# Patient Record
Sex: FEMALE | Race: WHITE | URBAN - METROPOLITAN AREA
[De-identification: names, ages, dates, MRNs, and addresses within clinical notes are randomized per-mention and may not be internally consistent; named-entity substitution may affect disease eponyms.]

---

## 2023-07-24 ENCOUNTER — OFFICE VISIT (OUTPATIENT)
Dept: AUDIOLOGY | Facility: CLINIC | Age: 63
End: 2023-07-24

## 2023-07-24 ENCOUNTER — OFFICE VISIT (OUTPATIENT)
Dept: OTOLARYNGOLOGY | Facility: CLINIC | Age: 63
End: 2023-07-24

## 2023-07-24 VITALS — SYSTOLIC BLOOD PRESSURE: 116 MMHG | HEART RATE: 86 BPM | WEIGHT: 154 LBS | DIASTOLIC BLOOD PRESSURE: 82 MMHG

## 2023-07-24 DIAGNOSIS — R42 VERTIGO: Primary | ICD-10-CM

## 2023-07-24 DIAGNOSIS — H90.3 ASYMMETRIC SNHL (SENSORINEURAL HEARING LOSS): ICD-10-CM

## 2023-07-24 DIAGNOSIS — H90.41 SENSORINEURAL HEARING LOSS (SNHL) OF RIGHT EAR WITH UNRESTRICTED HEARING OF LEFT EAR: Primary | ICD-10-CM

## 2023-07-24 DIAGNOSIS — H90.41 SENSORINEURAL HEARING LOSS (SNHL) OF RIGHT EAR WITH UNRESTRICTED HEARING OF LEFT EAR: ICD-10-CM

## 2023-07-24 PROCEDURE — 99204 OFFICE O/P NEW MOD 45 MIN: CPT | Performed by: OTOLARYNGOLOGY

## 2023-07-24 PROCEDURE — 3074F SYST BP LT 130 MM HG: CPT | Performed by: OTOLARYNGOLOGY

## 2023-07-24 PROCEDURE — 3079F DIAST BP 80-89 MM HG: CPT | Performed by: OTOLARYNGOLOGY

## 2023-07-24 PROCEDURE — 92557 COMPREHENSIVE HEARING TEST: CPT | Performed by: AUDIOLOGIST

## 2023-07-24 PROCEDURE — 92567 TYMPANOMETRY: CPT | Performed by: AUDIOLOGIST

## 2023-07-24 RX ORDER — OMEGA-3S/DHA/EPA/FISH OIL 1000-1400
CAPSULE,DELAYED RELEASE (ENTERIC COATED) ORAL
COMMUNITY

## 2023-07-24 RX ORDER — MECLIZINE HYDROCHLORIDE 25 MG/1
25 TABLET ORAL
COMMUNITY
Start: 2023-02-27

## 2023-07-24 RX ORDER — MULTIVIT-MINERALS/FOLIC ACID 200 MCG
TABLET,CHEWABLE ORAL
COMMUNITY

## 2023-07-24 RX ORDER — GAUZE BANDAGE 2" X 2"
BANDAGE TOPICAL
COMMUNITY

## 2023-07-24 RX ORDER — DESVENLAFAXINE SUCCINATE 50 MG/1
50 TABLET, EXTENDED RELEASE ORAL DAILY
COMMUNITY
Start: 2021-05-10

## 2023-07-24 RX ORDER — GLUCOSAMINE/CHONDR SU A SOD 750-600 MG
TABLET ORAL
COMMUNITY

## 2023-07-24 RX ORDER — DIPHENHYDRAMINE HCL/ZINC ACET 2 %-0.1 %
AEROSOL, SPRAY (GRAM) TOPICAL
COMMUNITY

## 2023-07-24 RX ORDER — VALACYCLOVIR HYDROCHLORIDE 500 MG/1
500 TABLET, FILM COATED ORAL DAILY
COMMUNITY
Start: 2022-05-26

## 2023-07-24 RX ORDER — ASCORBIC ACID 1000 MG
TABLET ORAL
COMMUNITY

## 2023-07-26 ENCOUNTER — TELEPHONE (OUTPATIENT)
Dept: OTOLARYNGOLOGY | Facility: CLINIC | Age: 63
End: 2023-07-26

## 2023-07-27 NOTE — TELEPHONE ENCOUNTER
Per pt asking for mri order to be sent to [facility] in [facility]    fax# [phone]

## 2023-07-28 NOTE — TELEPHONE ENCOUNTER
Per pt asking for a call when the mri order has been faxed to [facility].     Fax: [phone]

## 2023-08-01 NOTE — TELEPHONE ENCOUNTER
Per pt PCP ordered MRI , prior auth obtained by them. Pt does not need order faxed. Pt scheduled follow up and will bring report along with imaging .

## 2023-08-01 NOTE — PROGRESS NOTES
NEW PATIENT PROGRESS NOTE  OTOLOGY/OTOLARYNGOLOGY    REF MD:  [address]     PCP: Hero Lockette:  Patient presents with:  Dizziness: Patient here for vertigo symptoms x February     HISTORY OF PRESENT ILLNESS: Ed Hwang is a 63year old female who presents for evaluation of vertigo. The problem started in February. Thought it was from falling asleep with her neck extended. Woke up with vertigo that lasted 2 days so went to IC. Was sent to ED as had an abnormal EKG but ended up with no other findings. Was discharged on meclizine. Vertigo lasted about a week. Triggered by laying down, bending forward. Would last about a week intermittently lasting less than a minute each time. No hearing complaints. No associated ear symptoms. No tinnitus. Would go to chiropractor for neck adjustments which seemed helpful. Also went to accupuncture. Started vestibular PT and has had about 4 sessions. Denies hearing loss, hearing fluctuation, otorrhea, otalgia or previous otologic procedure. PAST MEDICAL HISTORY:  History reviewed. No pertinent past medical history. PAST SURGICAL HISTORY:  History reviewed. No pertinent surgical history. Calcium Carbonate-Vit D-Min (CALCIUM 600+D3 PLUS MINERALS) 600-800 MG-UNIT Oral Chew Tab, , Disp: , Rfl:   B Complex-C (SUPER B COMPLEX/VITAMIN C) Oral Tab, , Disp: , Rfl:   desvenlafaxine ER 50 MG Oral Tablet 24 Hr, Take 1 tablet (50 mg total) by mouth daily. , Disp: , Rfl:   Fiber Adult Gummies 2 g Oral Chew Tab, , Disp: , Rfl:   Ginkgo Biloba 40 MG Oral Tab, , Disp: , Rfl:   Lutein-Zeaxanthin 25-5 MG Oral Cap, , Disp: , Rfl:   meclizine 25 MG Oral Tab, Take 1 tablet (25 mg total) by mouth., Disp: , Rfl:   Multiple Vitamin (ONE-A-DAY ADULT VITACRAVES+DHA) Oral Chew Tab, , Disp: , Rfl:   Omega-3 Fatty Acids (CVS OMEGA-3 GUMMY FISH/DHA) 113.5 MG Oral Chew Tab, , Disp: , Rfl:   valACYclovir 500 MG Oral Tab, Take 1 tablet (500 mg total) by mouth daily. , Disp: , Rfl:     No current facility-administered medications on file prior to visit. Allergies:   Sulfa Antibiotics       HIVES, OTHER (SEE COMMENTS)    Comment:Swelling, rash, uticartia    SOCIAL HISTORY:  Social History    Tobacco Use      Smoking status: Not on file      Smokeless tobacco: Not on file    Alcohol use: Not on file      FAMILY HISTORY: Denies known family history of hearing loss, tinnitus, vertigo, or migraine. Denies known family history of head and neck cancer, thyroid cancer, bleeding disorders. REVIEW OF SYSTEMS:   Positives are in bold  Neuro: Headache, facial weakness, facial numbness, neck pain, vertigo  ENT: Hearing change, tinnitus, otorrhea, otalgia, aural fullness, ear pressure, vertigo, imbalance  Sinus pressure, rhinorrhea, congestion, facial pain, jaw pain, dysphagia, odynophagia, sore throat, voice changes, shortness of breath    EXAMINATION:  I washed my hands with an alcohol-based hand gel prior to examination  Constitutional:   --Vitals: Blood pressure 116/82, pulse 86, weight 154 lb (69.9 kg). --General: no apparent distress, well-developed, conversant  Psych: affect pleasant and appropriate for age, alert and oriented  Neuro: Facial movement normal bilateral  Eyes: Pupils equal, symmetric and reactive to light. Extra-ocular muscles intact  Respiratory: No stridor, stertor or increased work of breathing  ENT:  --Ear: The bilateral ears were examined under binocular microscopy  Right ear microscopic exam:  Pinna: Normal, no lesions or masses. Mastoid: Nontender on palpation. External auditory canal: Clear, no masses or lesions. Tympanic membrane: Intact, no lesions, normal landmarks. Middle ear: Aerated. Left ear microscopic exam:  Pinna: Normal, no lesions or masses. Mastoid: Nontender on palpation. External auditory canal: Clear, no masses or lesions. Tympanic membrane: Intact, no lesions, normal landmarks.   Middle ear: Aerated. Audiogram (date 7/27/23) interpreted and reviewed with the patient today showing:  Puretones: Right: normal to mild SNHL to normal Left: normal  Tymps: bilateral type A  SRT: Right: 10 Left: 10  SD: Right: 100 Left: 100    Latest Audiogram Result (Hz) Exam performed: 7/24/2023 4:28 PM Last edited by ELIZABETH Richter on 7/24/2023 4:37 PM        125 250  1500 2000 3000 4000 6000 8000    Right air:  5 10  10  20 20 25 20 5    Left air:  5 10  5  5 10 10 10 5    Right mastoid bone (masked):       20  25      Masking left (mastoid bone):       40  40         Reliability:  Good    Transducer: Inserts    Technique:  Conventional Audiometry    Comments:            Latest Speech Audiometry  Last edited by ELIZABETH Richter on 7/24/2023 4:37 PM       Ear Method SAT SRT MCL UCL Notes    right live voice  10       left live voice  10        Ear Method Test/List Score (%) Intensity Mask/Noise   right live voice 10 By Difficulty 100 55    left live voice 10 By Difficulty 100 55                      Latest Tympanogram Result       Probe Tone (Hz): 226 Exam performed: 7/24/2023 4:29 PM Last edited by ELIZABETH Richter on 7/24/2023 4:37 PM      Tympanograms  These were drawn by a user, not generated from device data      Right Ear Left Ear                     Right Ear Left Ear    Tympanogram type: Type A Type A    Canal volume (mL): 1 0.9    Peak pressure (daPa): 37 -17    Peak amplitude (mL): 0.4 0.3    Tympanogram width (daPa): Comments:                    Latest Audiogram and Tympanogram Result Text  Last edited by ELIZABETH Richter on 7/27/2023 12:14 PM      Addendum      Pt referred by Dr. Jed Braun for hearing testing due to dizziness and vertigo. Right ear: Within normal limits 250-1000 Hz. Slight to mild sensorineural hearing loss 1856-2998 Hz, rising to within normal limits at 8000 Hz. Left ear:  within normal limits 250-8000 Hz.     Normal ear canal volume with normal middle ear pressure and compliance bilaterally. Follow up with Dr. Lauralyn Mohs today. Addended by ELIZABETH Cramer on 7/27/2023 12:14 PM               ASSESSMENT/PLAN:  Nikki Abarca is a 63year old female with   (R42) Vertigo  (primary encounter diagnosis)  (H90.41) Sensorineural hearing loss (SNHL) of right ear with unrestricted hearing of left ear  (H90.3) Asymmetric SNHL (sensorineural hearing loss)     IMPRESSION:  Vertigo, consistent with BPPV, no present today  Asymmetric SNHL - right worse than left  Right SNHL    PLAN:  -I recommend that the asymmetric sensorineural hearing loss and/or tinnitus be evaluated with an MRI of the brain and IAC's with gadolinium. This test is medically necessary to assess for retrocochlear pathology such as a vestibular schwannoma.  -Will retest for BPPV at next visit   -Follow-up after imaging     Situation reviewed with the patient in detail. Aldo Strong MD  Otology/Otolaryngology  [facility]   [address] [facility]  Phone [phone]  Fax [phone]

## 2023-08-13 ENCOUNTER — PATIENT MESSAGE (OUTPATIENT)
Dept: OTOLARYNGOLOGY | Facility: CLINIC | Age: 63
End: 2023-08-13

## 2023-08-15 NOTE — TELEPHONE ENCOUNTER
From: Miya Sevilla  To: Grey Lr MD  Sent: 8/13/2023 10:03 AM CDT  Subject: MRI report attached    Hi. I will bring you the CD at my appointment.     Thank you  Debi Carlos

## 2023-08-15 NOTE — TELEPHONE ENCOUNTER
Future Appointments   Date Time Provider Telma Alex   8/21/2023 10:30 AM Howie Cabrera MD [address] [facility]

## 2023-08-21 ENCOUNTER — OFFICE VISIT (OUTPATIENT)
Dept: OTOLARYNGOLOGY | Facility: CLINIC | Age: 63
End: 2023-08-21

## 2023-08-21 VITALS — TEMPERATURE: 98 F | WEIGHT: 154 LBS

## 2023-08-21 DIAGNOSIS — H81.13 BPPV (BENIGN PAROXYSMAL POSITIONAL VERTIGO), BILATERAL: ICD-10-CM

## 2023-08-21 DIAGNOSIS — H90.3 ASYMMETRIC SNHL (SENSORINEURAL HEARING LOSS): Primary | ICD-10-CM

## 2023-08-21 PROCEDURE — 99214 OFFICE O/P EST MOD 30 MIN: CPT | Performed by: OTOLARYNGOLOGY

## 2023-08-21 RX ORDER — ASPIRIN 81 MG/1
162 TABLET ORAL DAILY
COMMUNITY
Start: 2023-08-11

## 2023-11-28 ENCOUNTER — OFFICE VISIT (OUTPATIENT)
Dept: NEUROLOGY | Facility: CLINIC | Age: 63
End: 2023-11-28
Payer: COMMERCIAL

## 2023-11-28 VITALS — OXYGEN SATURATION: 96 % | DIASTOLIC BLOOD PRESSURE: 84 MMHG | HEART RATE: 86 BPM | SYSTOLIC BLOOD PRESSURE: 121 MMHG

## 2023-11-28 DIAGNOSIS — R90.89 ABNORMAL BRAIN MRI: ICD-10-CM

## 2023-11-28 DIAGNOSIS — H81.399 EPISODIC PERIPHERAL VERTIGO: ICD-10-CM

## 2023-11-28 DIAGNOSIS — E78.5 HYPERLIPIDEMIA, UNSPECIFIED HYPERLIPIDEMIA TYPE: ICD-10-CM

## 2023-11-28 DIAGNOSIS — R42 VERTIGO: Primary | ICD-10-CM

## 2023-11-28 DIAGNOSIS — G47.33 OSA ON CPAP: ICD-10-CM

## 2023-11-28 DIAGNOSIS — Z86.73 CHRONIC CEREBROVASCULAR ACCIDENT (CVA): ICD-10-CM

## 2023-11-28 PROCEDURE — 3079F DIAST BP 80-89 MM HG: CPT | Performed by: STUDENT IN AN ORGANIZED HEALTH CARE EDUCATION/TRAINING PROGRAM

## 2023-11-28 PROCEDURE — 99204 OFFICE O/P NEW MOD 45 MIN: CPT | Performed by: STUDENT IN AN ORGANIZED HEALTH CARE EDUCATION/TRAINING PROGRAM

## 2023-11-28 PROCEDURE — 3074F SYST BP LT 130 MM HG: CPT | Performed by: STUDENT IN AN ORGANIZED HEALTH CARE EDUCATION/TRAINING PROGRAM

## 2023-11-28 RX ORDER — ROSUVASTATIN CALCIUM 20 MG/1
20 TABLET, COATED ORAL DAILY
COMMUNITY
Start: 2023-08-11

## 2023-11-28 NOTE — PROGRESS NOTES
[address]  [address], [address] [place] [address]  [phone]              Date: November 28, 2023  Patient Name: Guicho Ortega   MRN: [id number]    Reason for Evaluation: second opinion for her vertigo and mri lesion. Pt presents today for 2nd opinion. Had MRI, was told had \"mini stroke\" & then was told possible lesion vs CVA. Was put on statin and baby aspirin. Pt brought imaging reports & CDs      HPI:     Guicho Ortega is a 66year old right handed female with PMH of MARSHALL on CPAP, HLP, Depression, chronic back pain, was seen in the clinic for second opinion for her vertigo and MRI findings. In February 2023, patient used a pillow that was prescribed by chiropractor for neck pain, when she woke up in the morning she had acute onset vertigo described as room spinning sensation especially with the head movement that lasted for around 20 to 30 seconds, denies any associated double vision or blurry vision or vomiting, endorses nausea. Denies any speech difficulty or focal muscle weakness or tingling numbness. This lasted for couple of days. She went to urgent care, there was some concern regarding abnormal EKG with possible anterior fascicular block, she was referred to ER for that. Had a CT head done which was unremarkable. She was treated with meclizine, denies significant improvement with meclizine. This time her vertigo lasted for couple of weeks and then it improved. At that time patient denies any tinnitus ear pain or ear discharge. But she did follow-up with ENT and was diagnosed with hearing loss on the right side. She did try vestibular therapy, had some improvement in symptoms with that. As per chart review patient does have a history of bilateral myringotomy and tubes placed.   She did have history of recurrent ear infections in past.    Patient's vertiginous symptoms improved, it started again in May 2023, endorses vertigo described as room spinning sensations especially with the head movement and neck movement and eye movement lasting for couple of seconds associated with nausea. Denies any other symptoms associated with the vertigo. She also followed up with neurology for similar complaints. Had MRI brain/IACs with and without contrast done on 8/10/2023 which did not show any acute intracranial abnormity. Normal IACs, right cerebellar chronic lacunar infarct noted. Normal brainstem and cerebellar contours. No abnormal enhancement noted. She also had CTA head and neck on 8/17/2023 which did not show any flow-limiting stenosis or aneurysm or occlusion in the anterior posterior circulation in the head and the neck. Lungs-  Left upper lobe nodule noted. Hba1c- 5.5   Lipid panel was done, LDL cholesterol was 155. Currently she is taking Crestor. Not taking aspirin. She had also followed up with cardiology, has EKG done, stress test and 2D echo done. 2D echo 9/26/2023 EF of 67%, negative bubble study, normal right and left atrial pressure. Since then she denies any recurrence of vertiginous symptoms or any other new neurologic concerns. OUTPATIENT MEDICATIONS  Current Outpatient Medications on File Prior to Visit   Medication Sig Dispense Refill    Misc Natural Products (WHITE WILLOW BARK OR) Take 1 tablet by mouth every other day. rosuvastatin 20 MG Oral Tab Take 1 tablet (20 mg total) by mouth daily. Calcium Carbonate-Vit D-Min (CALCIUM 600+D3 PLUS MINERALS) 600-800 MG-UNIT Oral Chew Tab       B Complex-C (SUPER B COMPLEX/VITAMIN C) Oral Tab       desvenlafaxine ER 50 MG Oral Tablet 24 Hr Take 1 tablet (50 mg total) by mouth daily. Fiber Adult Gummies 2 g Oral Chew Tab       Ginkgo Biloba 40 MG Oral Tab       Lutein-Zeaxanthin 25-5 MG Oral Cap       meclizine 25 MG Oral Tab Take 1 tablet (25 mg total) by mouth as needed.       Multiple Vitamin (ONE-A-DAY ADULT VITACRAVES+DHA) Oral Chew Tab       Omega-3 Fatty Acids (CVS OMEGA-3 GUMMY FISH/DHA) 113.5 MG Oral Chew Tab       valACYclovir 500 MG Oral Tab Take 1 tablet (500 mg total) by mouth daily. aspirin 81 MG Oral Tab EC Take 2 tablets (162 mg total) by mouth daily. (Patient not taking: Reported on 11/28/2023)       No current facility-administered medications on file prior to visit. MEDICAL HISTORY  No past medical history on file. SURGICAL HISTORY  No past surgical history on file. SOCIAL HISTORY  Social History     Socioeconomic History    Marital status: Single   Tobacco Use    Smoking status: Never    Smokeless tobacco: Never   Substance and Sexual Activity    Alcohol use: Not Currently       FAMILY HISTORY  No family history on file. ALLERGIES  Allergies   Allergen Reactions    Sulfa Antibiotics HIVES and OTHER (SEE COMMENTS)     Swelling, rash, uticartia    Radiology Contrast Iodinated Dyes RASH       REVIEW OF SYSTEMS:   13-point review of systems was done and is negative unless otherwise stated in HPI. PHYSICAL EXAM:   /84   Pulse 86   SpO2 96%   General appearance: Well appearing, alert and in no acute distress  Skin: skin color normal.  No rashes or lesions. Head: Normocephalic, atraumatic.     Neurological exam:    Mental status   Alert and oriented   Attention/Concentration: intact attention on bedside test   Fund of knowledge: intact  Speech: no dysarthria or aphasia      Cranial nerves   II - visual fields intact to confrontation                                                III, IV, VI - extra-ocular muscles full: no pupillary defect, no nystagmus, no ptosis   V - normal facial sensation                                                               VII - normal facial symmetry                                                             VIII - intact hearing                                                                             IX, X - symmetrical palate XI - symmetrical shoulder shrug                                                          Motor function  Normal muscle bulk and tone  Muscle strength: normal power 5/5     Sensory function Intact to touch in bilateral upper and lower extremities. Cerebellar Finger to nose: no ataxia or dysmetria      Reflex function Intact 2+ DTR and symmetric. Negative Babinski     Gait                  Arises independently;   normal posture; gait stable   Heel, toe walking performed without difficulty  Tandem gait with some difficulty         LABS/DATA and IMAGING:    MRI brain/IACs with and without contrast done on 8/10/2023 which did not show any acute intracranial abnormity. Normal IACs, right cerebellar chronic lacunar infarct noted. Normal brainstem and cerebellar contours. No abnormal enhancement noted. CTA head and neck on 8/17/2023 which did not show any flow-limiting stenosis or aneurysm or occlusion in the anterior posterior circulation in the head and the neck. Lungs-  Left upper lobe nodule noted. Lipid panel was done, LDL cholesterol was 155. Hemoglobin A1c 5.5    She had also followed up with cardiology, has EKG done, stress test and 2D echo done. 2D echo 9/26/2023 EF of 67%, negative bubble study, normal right and left atrial pressure. ASSESSMENT:    Ed Hwang is a 66year old right handed female with PMH of MARSHALL on CPAP, HLP, Depression, chronic back pain, was seen in the clinic for second opinion for her vertigo and MRI findings. Patient with episodes of vertigo with nausea  MRI brain -no acute infarct, ?? Lesion in the right cerebellar hemisphere- ?? Possible chronic infarct. This does not explain patient's symptoms    Episodes of positional vertigo-possible BPPV    Plan  Discussed with patient MRI of the brain and CTA head and neck finding. Discussed about possible brain lesion noted in right cerebellar hemisphere concerning for? Chronic infarct.      CTA head and neck did not show any flow-limiting stenosis or occlusion in anterior posterior circulation in the head and the neck. Stroke risk factors include hyperlipidemia, MARSHALL on CPAP  Recommend aspirin 81 mg along with statins for secondary stroke prevention considering ? Chronic infarct on mri brain   Patient is currently taking Crestor, goal LDL less than 70. Patient's LDL was 155. Importance of regular exercise and healthy diet was discussed with patient. Discussed importance of CPAP compliance for her MARSHALL specially from secondary stroke prevention standpoint. STROKE RISK FACTORS:   *Hypertension - Target blood pressure <140/90, <130/85 for high risk, normal 120/80  *Physical inactivity - target exercise at least 3 times per week  *Obesity - target ideal body weight and girth <35\" for women, <40\" for men  *Diabetes - Target <6.5-7%   *Smoking - Target smoking cessation  *Hyperlipidemia - Target total cholesterol < 200, Target LDL <100, < 70 for high risk, Target HDL >45 for men, >55 for women, Target triglycerides <150    For episodes of postural vertigo  Recommend PT vestibular therapy and vestibular exercises at home as needed  Meclizine 25 mg twice daily as needed for vertigo  Instructed patient not to drive if she is having any vertiginous symptoms      We will follow-up in clinic as needed   Instructed patient to call the clinic if symptoms worsen or do not any new neurologic concerns or if develop any side effects. This note was prepared using [id number] voice recognition dictation software and as a result, errors may occur. When identified, these errors have been corrected.  While every attempt is made to correct errors during dictation, discrepancies may still exist    Eleazar Guerrero MD,